# Patient Record
Sex: MALE | Race: WHITE
[De-identification: names, ages, dates, MRNs, and addresses within clinical notes are randomized per-mention and may not be internally consistent; named-entity substitution may affect disease eponyms.]

---

## 2018-09-24 ENCOUNTER — HOSPITAL ENCOUNTER (INPATIENT)
Dept: HOSPITAL 56 - MW.SDS | Age: 68
LOS: 1 days | Discharge: HOME | DRG: 561 | End: 2018-09-25
Attending: ORTHOPAEDIC SURGERY | Admitting: ORTHOPAEDIC SURGERY
Payer: MEDICARE

## 2018-09-24 DIAGNOSIS — Z79.82: ICD-10-CM

## 2018-09-24 DIAGNOSIS — K21.9: ICD-10-CM

## 2018-09-24 DIAGNOSIS — N18.9: ICD-10-CM

## 2018-09-24 DIAGNOSIS — Z79.4: ICD-10-CM

## 2018-09-24 DIAGNOSIS — K22.70: ICD-10-CM

## 2018-09-24 DIAGNOSIS — I10: ICD-10-CM

## 2018-09-24 DIAGNOSIS — I25.10: ICD-10-CM

## 2018-09-24 DIAGNOSIS — E78.00: ICD-10-CM

## 2018-09-24 DIAGNOSIS — M17.12: Primary | ICD-10-CM

## 2018-09-24 DIAGNOSIS — Z87.891: ICD-10-CM

## 2018-09-24 DIAGNOSIS — Z47.1: ICD-10-CM

## 2018-09-24 DIAGNOSIS — E11.22: ICD-10-CM

## 2018-09-24 DIAGNOSIS — Z96.652: ICD-10-CM

## 2018-09-24 DIAGNOSIS — E11.9: ICD-10-CM

## 2018-09-24 DIAGNOSIS — Z79.899: ICD-10-CM

## 2018-09-24 DIAGNOSIS — G47.30: ICD-10-CM

## 2018-09-24 LAB
CHLORIDE SERPL-SCNC: 105 MMOL/L (ref 98–107)
SODIUM SERPL-SCNC: 140 MMOL/L (ref 136–148)

## 2018-09-24 PROCEDURE — 86901 BLOOD TYPING SEROLOGIC RH(D): CPT

## 2018-09-24 PROCEDURE — 86900 BLOOD TYPING SEROLOGIC ABO: CPT

## 2018-09-24 PROCEDURE — 73560 X-RAY EXAM OF KNEE 1 OR 2: CPT

## 2018-09-24 PROCEDURE — C1713 ANCHOR/SCREW BN/BN,TIS/BN: HCPCS

## 2018-09-24 PROCEDURE — 82962 GLUCOSE BLOOD TEST: CPT

## 2018-09-24 PROCEDURE — 86850 RBC ANTIBODY SCREEN: CPT

## 2018-09-24 PROCEDURE — 0SRD069 REPLACEMENT OF LEFT KNEE JOINT WITH OXIDIZED ZIRCONIUM ON POLYETHYLENE SYNTHETIC SUBSTITUTE, CEMENTED, OPEN APPROACH: ICD-10-PCS | Performed by: ORTHOPAEDIC SURGERY

## 2018-09-24 PROCEDURE — 36415 COLL VENOUS BLD VENIPUNCTURE: CPT

## 2018-09-24 PROCEDURE — 27447 TOTAL KNEE ARTHROPLASTY: CPT

## 2018-09-24 PROCEDURE — 85025 COMPLETE CBC W/AUTO DIFF WBC: CPT

## 2018-09-24 PROCEDURE — 80053 COMPREHEN METABOLIC PANEL: CPT

## 2018-09-24 PROCEDURE — C1776 JOINT DEVICE (IMPLANTABLE): HCPCS

## 2018-09-24 RX ADMIN — ACETAMINOPHEN SCH MLS/HR: 10 INJECTION, SOLUTION INTRAVENOUS at 18:18

## 2018-09-24 RX ADMIN — ACETAMINOPHEN SCH MLS/HR: 10 INJECTION, SOLUTION INTRAVENOUS at 13:40

## 2018-09-24 RX ADMIN — ACETAMINOPHEN SCH MLS/HR: 10 INJECTION, SOLUTION INTRAVENOUS at 07:13

## 2018-09-24 RX ADMIN — KETOROLAC TROMETHAMINE SCH MG: 15 INJECTION, SOLUTION INTRAMUSCULAR; INTRAVENOUS at 14:59

## 2018-09-24 RX ADMIN — KETOROLAC TROMETHAMINE SCH MG: 15 INJECTION, SOLUTION INTRAMUSCULAR; INTRAVENOUS at 20:17

## 2018-09-24 RX ADMIN — ACETAMINOPHEN SCH MLS/HR: 10 INJECTION, SOLUTION INTRAVENOUS at 13:48

## 2018-09-24 NOTE — PCM.CONS
<Zena VincentandaSincere - Last Filed: 09/24/18 13:44>





H&P History of Present Illness





- General


Date of Service: 09/24/18


Admit Problem/Dx: 


 Admission Diagnosis/Problem





Admission Diagnosis/Problem      Replacement of total knee joint











- History of Present Illness


Initial Comments - Free Text/Narative: 





Daniel Carter is a 69 y/o male with history of Diabetes Mellitus type 2 on 

insulin and hypertension who is s/p left total knee arthroplasty by Dr. Bravo 

on 9/24/18.  Patient denies any pain in his lower extremities. States that his 

pain is well controlled with current medications. Denies any chest pain, dyspnea

, abdominal pain. No dysuria or change in bowel movements. 





- Related Data


Allergies/Adverse Reactions: 


 Allergies











Allergy/AdvReac Type Severity Reaction Status Date / Time


 


apple Allergy  Hives Verified 09/24/18 13:15











Home Medications: 


 Home Meds





Allopurinol [Zyloprim] 300 mg PO DAILY 12/28/17 [History]


Aspirin [Adult Low Dose Aspirin EC] 81 mg PO DAILY 12/28/17 [History]


Bisoprolol [Zebeta] 5 mg PO DAILY 12/28/17 [History]


Hydrochlorothiazide 25 mg PO DAILY 12/28/17 [History]


Insulin Aspart [NovoLOG] 8 unit SQ TIDMEALS 12/28/17 [History]


Insulin Glarg,Human.Rec.Analog [LantUS Solostar] 20 unit SQ QPM 12/28/17 [

History]


Omeprazole 20 mg PO DAILY 12/28/17 [History]


Pioglitazone HCl [Actos] 30 mg PO DAILY 12/28/17 [History]


Rosuvastatin [Crestor] 10 mg PO DAILY 12/28/17 [History]


amLODIPine [Norvasc] 10 mg PO DAILY 12/28/17 [History]











Past Medical History


HEENT History: Reports: Other (See Below)


Other HEENT History: wears glasses, has 2 upper dental implants


Cardiovascular History: Reports: CAD, High Cholesterol, Hypertension


Other Cardiovascular History: Stress test and Echo completed in June 2018


Respiratory History: Reports: Sleep Apnea


Other Respiratory History: uses CPAP


Gastrointestinal History: Reports: GERD, Hiatal Hernia, Other (See Below)


Other Gastrointestinal History: has Barretts Esophagus


Genitourinary History: Reports: None


Musculoskeletal History: Reports: Gout, Osteoarthritis


Neurological History: Reports: None


Endocrine/Metabolic History: Reports: Diabetes, Type II, Obesity/BMI 30+





- Infectious Disease History


Infectious Disease History: Reports: Chicken Pox





- Past Surgical History


Head Surgeries/Procedures: Reports: None


HEENT Surgical History: Reports: Adenoidectomy, Oral Surgery, Tonsillectomy


Other HEENT Surgeries/Procedures: 2 dental implants


Cardiovascular Surgical History: Reports: Vascular Surgery


Other Cardiovascular Surgeries/Procedures: right femoral artery stent placement


GI Surgical History: Reports: Colonoscopy, EGD, Nissen Fundoplication


Neurological Surgical History: Reports: Lumbar Spine


Other Neurological Surgeries/Procedures: hx back surgery, denies hardware


Musculoskeletal Surgical History: Reports: Arthroscopic Knee, Shoulder Surgery


Other Musculoskeletal Surgeries/Procedures:: left RTCR, denies hardware


Dermatological Surgical History: Reports: None





Social & Family History





- Family History


HEENT: Reports: None


Cardiac: Reports: CAD, Hypertension


Respiratory: Reports: Asthma, Sleep Apnea


GI: Reports: GERD


: Reports: None


OBGYN: Reports: None


Musculoskeletal: Reports: Arthritis, Gout, Neck Pain, Chronic, Osteoarthritis


Neurological: Reports: None


Psychiatric: Reports: None


Endocrine/Metabolic: Reports: Diabetes, type II


Hematologic: Reports: None


Immunologic: Reports: None


Dermatologic: Reports: None


Oncologic: Reports: None





- Tobacco Use


Smoking Status *Q: Former Smoker


Years of Tobacco use: 23


Packs/Tins Daily: 2


Used Tobacco, but Quit: Yes


Month/Year Tobacco Last Used: 29 years


Second Hand Smoke Exposure: No





- Caffeine Use


Caffeine Use: Reports: Coffee, Tea





- Recreational Drug Use


Recreational Drug Use: No


Drug Use in Last 12 Months: No





H&P Review of Systems





- Review of Systems:


Review Of Systems: ROS reveals no pertinent complaints other than HPI.





Exam





- Exam


Exam: See Below





- Vital Signs


Vital Signs: 


 Last Vital Signs











Temp  36.8 C   09/24/18 06:50


 


Pulse  71   09/24/18 10:52


 


Resp  12   09/24/18 10:52


 


BP  118/46 L  09/24/18 10:52


 


Pulse Ox  94 L  09/24/18 10:52











Weight: 113.398 kg





- Exam


General: Alert, Oriented, 4


HEENT: Conjunctiva Clear, Mucosa Moist & Pink, Posterior Pharynx Clear, Pupils 

Equal, Pupils Reactive, PERRLA


Neck: Supple, Trachea Midline, 2


Lungs: Clear to Auscultation, Normal Respiratory Effort


Cardiovascular: Regular Rate, Regular Rhythm


GI/Abdominal Exam: Normal Bowel Sounds, Soft, Non-Tender, No Organomegaly, No 

Distention, No Abnormal Bruit, No Mass, Pelvis Stable


 (Male) Exam: Deferred


Rectal (Males) Exam: Deferred


Back Exam: Normal Inspection, Full Range of Motion, NT


Extremities: Other (Left lower extremity- Pedal pulses intact. Patient able to 

wiggle his toes and elevate his leg. )


Skin: Warm, Dry, Intact


Neurological: Cranial Nerves Intact


Neuro Extensive - Mental Status: Alert, Oriented x3, Normal Mood/Affect, Normal 

Cognition


Psychiatric: Alert, Normal Affect, Normal Mood





- Patient Data


Lab Results Last 24 hrs: 


 Laboratory Results - last 24 hr











  09/24/18 09/24/18 09/24/18 Range/Units





  07:10 11:39 12:20 


 


WBC     (4.0-11.0)  K/uL


 


RBC     (4.50-5.90)  M/uL


 


Hgb     (13.0-17.0)  g/dL


 


Hct     (38.0-50.0)  %


 


MCV     (80.0-98.0)  fL


 


MCH     (27.0-32.0)  pg


 


MCHC     (31.0-37.0)  g/dL


 


RDW Std Deviation     (28.0-62.0)  fl


 


RDW Coeff of Guillermina     (11.0-15.0)  %


 


Plt Count     (150-400)  K/uL


 


MPV     (7.40-12.00)  fL


 


Neut % (Auto)     (48.0-80.0)  %


 


Lymph % (Auto)     (16.0-40.0)  %


 


Mono % (Auto)     (0.0-15.0)  %


 


Eos % (Auto)     (0.0-7.0)  %


 


Baso % (Auto)     (0.0-1.5)  %


 


Neut # (Auto)     (1.4-5.7)  K/uL


 


Lymph # (Auto)     (0.6-2.4)  K/uL


 


Mono # (Auto)     (0.0-0.8)  K/uL


 


Eos # (Auto)     (0.0-0.7)  K/uL


 


Baso # (Auto)     (0.0-0.1)  K/uL


 


Nucleated RBC %     /100WBC


 


Nucleated RBCs #     K/uL


 


Sodium    140  (136-148)  mmol/L


 


Potassium    4.5  (3.5-5.1)  mmol/L


 


Chloride    105  ()  mmol/L


 


Carbon Dioxide    26.9  (21.0-32.0)  mmol/L


 


BUN    31 H  (7.0-18.0)  mg/dL


 


Creatinine    1.9 H  (0.8-1.3)  mg/dL


 


Est Cr Clr Drug Dosing    39.71  mL/min


 


Estimated GFR (MDRD)    35.2  ml/min


 


Glucose    151 H  ()  mg/dL


 


POC Glucose   141 H   ()  mg/dL


 


Calcium    8.6  (8.5-10.1)  mg/dL


 


Total Bilirubin    0.2  (0.2-1.0)  mg/dL


 


AST    15  (15-37)  IU/L


 


ALT    21  (14-63)  IU/L


 


Alkaline Phosphatase    103  ()  U/L


 


Total Protein    5.9 L  (6.4-8.2)  g/dL


 


Albumin    2.8 L  (3.4-5.0)  g/dL


 


Globulin    3.1  (2.0-3.5)  g/dL


 


Albumin/Globulin Ratio    0.9 L  (1.3-2.8)  


 


Blood Type  O POSITIVE    


 


Antibody Screen  NEGATIVE    














  09/24/18 Range/Units





  12:20 


 


WBC  7.13  (4.0-11.0)  K/uL


 


RBC  4.62  (4.50-5.90)  M/uL


 


Hgb  14.3  (13.0-17.0)  g/dL


 


Hct  41.9  (38.0-50.0)  %


 


MCV  90.7  (80.0-98.0)  fL


 


MCH  31.0  (27.0-32.0)  pg


 


MCHC  34.1  (31.0-37.0)  g/dL


 


RDW Std Deviation  47.2  (28.0-62.0)  fl


 


RDW Coeff of Guillermina  14  (11.0-15.0)  %


 


Plt Count  168  (150-400)  K/uL


 


MPV  9.70  (7.40-12.00)  fL


 


Neut % (Auto)  77.5  (48.0-80.0)  %


 


Lymph % (Auto)  15.1 L  (16.0-40.0)  %


 


Mono % (Auto)  6.5  (0.0-15.0)  %


 


Eos % (Auto)  0.6  (0.0-7.0)  %


 


Baso % (Auto)  0.3  (0.0-1.5)  %


 


Neut # (Auto)  5.5  (1.4-5.7)  K/uL


 


Lymph # (Auto)  1.1  (0.6-2.4)  K/uL


 


Mono # (Auto)  0.5  (0.0-0.8)  K/uL


 


Eos # (Auto)  0.0  (0.0-0.7)  K/uL


 


Baso # (Auto)  0.0  (0.0-0.1)  K/uL


 


Nucleated RBC %  0.0  /100WBC


 


Nucleated RBCs #  0  K/uL


 


Sodium   (136-148)  mmol/L


 


Potassium   (3.5-5.1)  mmol/L


 


Chloride   ()  mmol/L


 


Carbon Dioxide   (21.0-32.0)  mmol/L


 


BUN   (7.0-18.0)  mg/dL


 


Creatinine   (0.8-1.3)  mg/dL


 


Est Cr Clr Drug Dosing   mL/min


 


Estimated GFR (MDRD)   ml/min


 


Glucose   ()  mg/dL


 


POC Glucose   ()  mg/dL


 


Calcium   (8.5-10.1)  mg/dL


 


Total Bilirubin   (0.2-1.0)  mg/dL


 


AST   (15-37)  IU/L


 


ALT   (14-63)  IU/L


 


Alkaline Phosphatase   ()  U/L


 


Total Protein   (6.4-8.2)  g/dL


 


Albumin   (3.4-5.0)  g/dL


 


Globulin   (2.0-3.5)  g/dL


 


Albumin/Globulin Ratio   (1.3-2.8)  


 


Blood Type   


 


Antibody Screen   











Result Diagrams: 


 09/24/18 12:20





 09/24/18 12:20





Consult PN Assessment/Plan


Procedures: 


Procedures





CARDIOVASCULAR STRESS TEST (04/25/18)


DRAIN/INJ JOINT/BURSA W/O US (02/13/18)


HT MUSCLE IMAGE SPECT MULT (04/25/18)


KNEE ARTHROSCOPY/SURGERY (01/03/18)


METABOLIC PANEL TOTAL CA (05/25/18)


MRI JNT OF LWR EXTRE W/O DYE (08/13/18)


OFFICE/OUTPATIENT VISIT EST (02/13/18)


ROUTINE VENIPUNCTURE (05/25/18)


TTE W/DOPPLER COMPLETE (05/10/18)


X-RAY EXAM KNEE 4 OR MORE (12/12/17)


X-RAY EXAM OF SHOULDER (02/13/18)








Problem List Initiated/Reviewed/Updated: Yes


Plan: 





1. Osteoarthritis s/p left total knee arthroplasty on 9/24/18.  Will continue 

with current pain management per orthopedic team. PT/OT has been ordered by 

ortho team.


2. Chronic kidney disease. Likely secondary to diabetes mellitus type 2. Patient

's Creatinine is 1.9.  His previous Cr in May 2018 was 1.8.  This seems to be 

his baseline.  Will continue to monitor his I/O's.  If worsening Cr, will 

discontinue medications such as allopurinol and diuretics and start IV fluids.


3. Isulin dependent Diabetes mellitus. Will continue with patient's home 

regimen of Novolog 8 U TID and Lantus 20 U QHS.


4. Hypertension, controlled. Will continue with current blood pressure 

medications.


5. DVT prophylaxis. Per orthopedic team.





<Lb Hernandez - Last Filed: 09/24/18 16:01>





H&P History of Present Illness





- General


Admit Problem/Dx: 


 Admission Diagnosis/Problem





Admission Diagnosis/Problem      Replacement of total knee joint











Exam





- Vital Signs


Vital Signs: 


 Last Vital Signs











Temp  36.3 C   09/24/18 13:47


 


Pulse  65   09/24/18 13:47


 


Resp  16   09/24/18 13:47


 


BP  126/62   09/24/18 13:47


 


Pulse Ox  96   09/24/18 13:47














- Patient Data


Lab Results Last 24 hrs: 


 Laboratory Results - last 24 hr











  09/24/18 09/24/18 09/24/18 Range/Units





  07:10 11:39 12:20 


 


WBC     (4.0-11.0)  K/uL


 


RBC     (4.50-5.90)  M/uL


 


Hgb     (13.0-17.0)  g/dL


 


Hct     (38.0-50.0)  %


 


MCV     (80.0-98.0)  fL


 


MCH     (27.0-32.0)  pg


 


MCHC     (31.0-37.0)  g/dL


 


RDW Std Deviation     (28.0-62.0)  fl


 


RDW Coeff of Guillermina     (11.0-15.0)  %


 


Plt Count     (150-400)  K/uL


 


MPV     (7.40-12.00)  fL


 


Neut % (Auto)     (48.0-80.0)  %


 


Lymph % (Auto)     (16.0-40.0)  %


 


Mono % (Auto)     (0.0-15.0)  %


 


Eos % (Auto)     (0.0-7.0)  %


 


Baso % (Auto)     (0.0-1.5)  %


 


Neut # (Auto)     (1.4-5.7)  K/uL


 


Lymph # (Auto)     (0.6-2.4)  K/uL


 


Mono # (Auto)     (0.0-0.8)  K/uL


 


Eos # (Auto)     (0.0-0.7)  K/uL


 


Baso # (Auto)     (0.0-0.1)  K/uL


 


Nucleated RBC %     /100WBC


 


Nucleated RBCs #     K/uL


 


Sodium    140  (136-148)  mmol/L


 


Potassium    4.5  (3.5-5.1)  mmol/L


 


Chloride    105  ()  mmol/L


 


Carbon Dioxide    26.9  (21.0-32.0)  mmol/L


 


BUN    31 H  (7.0-18.0)  mg/dL


 


Creatinine    1.9 H  (0.8-1.3)  mg/dL


 


Est Cr Clr Drug Dosing    39.71  mL/min


 


Estimated GFR (MDRD)    35.2  ml/min


 


Glucose    151 H  ()  mg/dL


 


POC Glucose   141 H   ()  mg/dL


 


Calcium    8.6  (8.5-10.1)  mg/dL


 


Total Bilirubin    0.2  (0.2-1.0)  mg/dL


 


AST    15  (15-37)  IU/L


 


ALT    21  (14-63)  IU/L


 


Alkaline Phosphatase    103  ()  U/L


 


Total Protein    5.9 L  (6.4-8.2)  g/dL


 


Albumin    2.8 L  (3.4-5.0)  g/dL


 


Globulin    3.1  (2.0-3.5)  g/dL


 


Albumin/Globulin Ratio    0.9 L  (1.3-2.8)  


 


Blood Type  O POSITIVE    


 


Antibody Screen  NEGATIVE    














  09/24/18 Range/Units





  12:20 


 


WBC  7.13  (4.0-11.0)  K/uL


 


RBC  4.62  (4.50-5.90)  M/uL


 


Hgb  14.3  (13.0-17.0)  g/dL


 


Hct  41.9  (38.0-50.0)  %


 


MCV  90.7  (80.0-98.0)  fL


 


MCH  31.0  (27.0-32.0)  pg


 


MCHC  34.1  (31.0-37.0)  g/dL


 


RDW Std Deviation  47.2  (28.0-62.0)  fl


 


RDW Coeff of Guillermina  14  (11.0-15.0)  %


 


Plt Count  168  (150-400)  K/uL


 


MPV  9.70  (7.40-12.00)  fL


 


Neut % (Auto)  77.5  (48.0-80.0)  %


 


Lymph % (Auto)  15.1 L  (16.0-40.0)  %


 


Mono % (Auto)  6.5  (0.0-15.0)  %


 


Eos % (Auto)  0.6  (0.0-7.0)  %


 


Baso % (Auto)  0.3  (0.0-1.5)  %


 


Neut # (Auto)  5.5  (1.4-5.7)  K/uL


 


Lymph # (Auto)  1.1  (0.6-2.4)  K/uL


 


Mono # (Auto)  0.5  (0.0-0.8)  K/uL


 


Eos # (Auto)  0.0  (0.0-0.7)  K/uL


 


Baso # (Auto)  0.0  (0.0-0.1)  K/uL


 


Nucleated RBC %  0.0  /100WBC


 


Nucleated RBCs #  0  K/uL


 


Sodium   (136-148)  mmol/L


 


Potassium   (3.5-5.1)  mmol/L


 


Chloride   ()  mmol/L


 


Carbon Dioxide   (21.0-32.0)  mmol/L


 


BUN   (7.0-18.0)  mg/dL


 


Creatinine   (0.8-1.3)  mg/dL


 


Est Cr Clr Drug Dosing   mL/min


 


Estimated GFR (MDRD)   ml/min


 


Glucose   ()  mg/dL


 


POC Glucose   ()  mg/dL


 


Calcium   (8.5-10.1)  mg/dL


 


Total Bilirubin   (0.2-1.0)  mg/dL


 


AST   (15-37)  IU/L


 


ALT   (14-63)  IU/L


 


Alkaline Phosphatase   ()  U/L


 


Total Protein   (6.4-8.2)  g/dL


 


Albumin   (3.4-5.0)  g/dL


 


Globulin   (2.0-3.5)  g/dL


 


Albumin/Globulin Ratio   (1.3-2.8)  


 


Blood Type   


 


Antibody Screen   











Result Diagrams: 


 09/24/18 12:20





 09/24/18 12:20





Consult PN Assessment/Plan


Procedures: 


Procedures





CARDIOVASCULAR STRESS TEST (04/25/18)


DRAIN/INJ JOINT/BURSA W/O US (02/13/18)


HT MUSCLE IMAGE SPECT MULT (04/25/18)


KNEE ARTHROSCOPY/SURGERY (01/03/18)


METABOLIC PANEL TOTAL CA (05/25/18)


MRI JNT OF LWR EXTRE W/O DYE (08/13/18)


OFFICE/OUTPATIENT VISIT EST (02/13/18)


ROUTINE VENIPUNCTURE (05/25/18)


TTE W/DOPPLER COMPLETE (05/10/18)


X-RAY EXAM KNEE 4 OR MORE (12/12/17)


X-RAY EXAM OF SHOULDER (02/13/18)





Internal medicine service has been requested to provide consultation on patient 

by orthopedic surgeon. I have examined the patient independently of medical 

resident and I agree with the resident's findings and recommendations. We'll 

continue to follow closely this patient. I'd like to thank Dr. Bravo for 

participation in care of her patient.


Requesting Provider: Shelly


Date Consult Requested: 09/24/18


Patient History Reviewed: Yes


Admission H&P Reviewed: Yes


Notified Requestor: Yes

## 2018-09-24 NOTE — PCM.PREANE
Preanesthetic Assessment





- Procedure


Proposed Procedure: 





Left TKR





- Anesthesia/Transfusion/Family Hx


Anesthesia History: Prior Anesthesia Without Reaction


Family History of Anesthesia Reaction: No


Transfusion History: No Prior Transfusion(s)


Intubation History: Unknown


Additional History: 





sleep apnea hx; former smoker;insulin dependent diabetic (FBS at 0600 - 120) - 

took 1/2 Lantus last PM








- Review of Systems


General: Other (bilateral leg pain)


Pulmonary: No Symptoms, Other (EVA)


Cardiovascular: Other (HTN-treated; PVD)


Gastrointestinal: Other (Barretts esophagus)


Neurological: Gait Disturbance (due to knee pains)


Other: Reports: Diabetes (Type II - insulin dependent), Depression





- Physical Assessment


NPO Status Date: 09/23/18


NPO Status Time: 22:00


Height: 6 ft


Weight: 250 lb


ASA Class: 3


Mental Status: Alert & Oriented x3


Airway Class: Mallampati = 2


Dentition: Reports: Normal Dentition, Implants


Thyro-Mental Finger Breadths: 3


Mouth Opening Finger Breadths: 3


ROM/Head Extension: Full


Lungs: Clear to Auscultation, Normal Respiratory Effort


Cardiovascular: Regular Rate, Regular Rhythm, No Murmurs





- Allergies


Allergies/Adverse Reactions: 


 Allergies











Allergy/AdvReac Type Severity Reaction Status Date / Time


 


No Known Allergies Allergy   Verified 09/19/18 09:54














- Blood


Blood Available: No


Product(s) Available: None





- Anesthesia Plan


Pre-Op Medication Ordered: Other (meds for post op per surgeon)





- Acknowledgements


Anesthesia Type Planned: Spinal (sedation only unless airway problem)


Pt an Appropriate Candidate for the Planned Anesthesia: Yes


Alternatives and Risks of Anesthesia Discussed w Pt/Guardian: Yes


Pt/Guardian Understands and Agrees with Anesthesia Plan: Yes





PreAnesthesia Questionnaire


HEENT History: Reports: Other (See Below)


Other HEENT History: wears glasses, has 2 upper dental implants


Cardiovascular History: Reports: CAD, High Cholesterol, Hypertension


Respiratory History: Reports: Sleep Apnea


Other Respiratory History: uses CPAP


Gastrointestinal History: Reports: GERD, Hiatal Hernia, Other (See Below)


Other Gastrointestinal History: has Barretts Esophagus


Genitourinary History: Reports: None


Musculoskeletal History: Reports: Gout, Osteoarthritis


Endocrine/Metabolic History: Reports: Diabetes, Type II, Obesity/BMI 30+





- Past Surgical History


Head Surgeries/Procedures: Reports: None


HEENT Surgical History: Reports: Oral Surgery, Tonsillectomy


Other HEENT Surgeries/Procedures: 2 dental implants


Cardiovascular Surgical History: Reports: Vascular Surgery


Other Cardiovascular Surgeries/Procedures: right femoral artery stent placement


GI Surgical History: Reports: Colonoscopy, EGD, Nissen Fundoplication


Neurological Surgical History: Reports: Lumbar Spine


Other Neurological Surgeries/Procedures: hx back surgery, denies hardware


Musculoskeletal Surgical History: Reports: Arthroscopic Knee, Shoulder Surgery


Other Musculoskeletal Surgeries/Procedures:: left RTCR, denies hardware





- SUBSTANCE USE


Smoking Status *Q: Former Smoker


Recreational Drug Use History: No





- HOME MEDS


Home Medications: 


 Home Meds





Allopurinol [Zyloprim] 300 mg PO DAILY 12/28/17 [History]


Aspirin [Adult Low Dose Aspirin EC] 81 mg PO DAILY 12/28/17 [History]


Bisoprolol [Zebeta] 5 mg PO DAILY 12/28/17 [History]


Hydrochlorothiazide 25 mg PO DAILY 12/28/17 [History]


Insulin Aspart [NovoLOG] 8 unit SQ TIDMEALS 12/28/17 [History]


Insulin Glarg,Human.Rec.Analog [LantUS Solostar] 20 unit SQ QPM 12/28/17 [

History]


Omeprazole 20 mg PO DAILY 12/28/17 [History]


Pioglitazone HCl [Actos] 30 mg PO DAILY 12/28/17 [History]


Rosuvastatin [Crestor] 10 mg PO DAILY 12/28/17 [History]


amLODIPine [Norvasc] 10 mg PO DAILY 12/28/17 [History]











- CURRENT (IN HOUSE) MEDS


Current Meds: 





 Current Medications





Bupivacaine Liposome (Exparel)  20 ml INFILT ONETIME [facility]


Famotidine (Pepcid)  40 mg IVPUSH ONARRIVE [facility]


   Last Admin: 09/24/18 07:14 Dose:  40 mg


Acetaminophen 1,000 mg/ Premix  100 mls @ 400 mls/hr IV ONARRIVE [facility]


   Last Admin: 09/24/18 07:13 Dose:  400 mls/hr


Cefazolin Sodium/Dextrose 2 gm (/ Premix)  50 mls @ 100 mls/hr IV ONCALL [facility]


Ropivacaine 49.25 ml/Ketorolac Tromethamine 30 mg/Epinephrine HCl 0.5 mg/

Clonidine HCl 80 mcg/ Sodium Chloride  70 mls @ 50 mls/sec INJECT ASDIRECTED [facility]


Lactated Ringer's (Ringers, Lactated)  1,000 mls @ 100 mls/hr IV ASDIRECTED [facility]


Tranexamic Acid 4,000 mg/ (Sodium Chloride)  140 mls @ 600 mls/hr IV ASDIRECTED 

[facility]


Ketorolac Tromethamine (Toradol)  15 mg IVPUSH ONARRIVE [facility]


   Last Admin: 09/24/18 07:15 Dose:  15 mg


Scopolamine (Transderm-Scop)  1.5 mg TRDERM ONARRIVE [facility]


   Last Admin: 09/24/18 07:14 Dose:  1.5 mg

## 2018-09-24 NOTE — OR
SURGEON:

Shefali Bravo MD

 

DATE OF PROCEDURE:  09/24/2018

 

PREOPERATIVE DIAGNOSIS:

Degenerative joint disease, left knee, tricompartmental.

 

POSTOPERATIVE DIAGNOSIS:

Degenerative joint disease, left knee, tricompartmental.

 

PROCEDURE:

Left total knee arthroplasty using patient specific instrumentation.

 

ASSISTANT:

Ivory White PA-C

 

ANESTHESIA:

Spinal with sedation.

 

ESTIMATED BLOOD LOSS:

50 mL.

 

TOURNIQUET TIME:

48 minutes.

 

COMPLICATIONS:

None.

 

DVT PROPHYLAXIS:

PAS boot and NIDHI hose to the nonoperative leg.

 

IMPLANTS USED:

[employer] Persona femoral component size 9 standard (LPS), tibial component size G,

10 mm all-polyethylene articular surface, and 38 mm all-polyethylene patella.

 

INTRAOPERATIVE FINDINGS:

Showed tricompartmental degenerative changes with grade 4 chondromalacia in all

compartments.  No significant synovitis was noted.  Small osteophytes were also

found.

 

BRIEF HISTORY:

Daniel is a 68-year-old male who has had complaint of progressive left knee

pain.  X-rays did confirm tricompartmental degenerative changes.  He failed to

respond to conservative treatment.  Due to his lack of response to conservative

treatment, I did recommend surgical intervention.  The risks and goals of

procedure were discussed with the patient and were documented preoperatively.

He agreed to proceed.

 

DESCRIPTION OF PROCEDURE:

The patient was properly identified and brought to the operating room.  The

patient was then transferred from the operating room cart and placed on the

operating table in a supine position.  Anesthesia was administered by the

anesthesia staff.  After adequate anesthesia was obtained, a well-padded

tourniquet was applied to the surgical lower extremity.  Lisa catheter was

placed. The lower extremity was then prepped in standard fashion using

ChloraPrep solution.  It was then sterilely draped.  A time-out was performed to

ensure correct site and procedure.  Preoperative antibiotics were given along

with one gram of tranexamic acid IV.  The surgical site had been marked

preoperatively.  An Esmarch was used to exsanguinate the left lower extremity

and the tourniquet was inflated.

 

An incision was made over the anterior aspect of the knee.  The subcutaneous

tissues were dissected down to the level of the fascia.  A medial parapatellar

approach to the knee was made.  A portion of the infrapatellar fat pad was then

excised.  The distal femur was then exposed.  The femoral patient-specific

cutting guide was then placed.  Pins were also placed.  The distal femoral

cutting block was placed and the distal femoral cut was made.  Instrumentation

was then removed.  Both Whitesides' line and the epicondylar axis were then

marked with electrocautery.  The 4-in-1 cutting block was placed.  This was

placed in a slightly externally rotated position, which corresponded well with

the previously drawn lines.  The cutting guide was then pinned into position.

An Edmundo wing guide was used to check the depth of resection of our anterior

condylar cut and it was felt that no notching would occur.  The anterior

condylar cut was then made followed by the posterior condylar cut.  Both the

posterior chamfer and anterior chamfer cuts were then made.  The cutting block

was then removed along with the excess bony remnants.

 

We then turned our attention to the tibia.  The anterior cruciate ligament and

posterior cruciate ligament were released and a posterior cruciate ligament

retractor was placed to allow the tibia to be pulled anteriorly.  The tibial

patient-specific guide was then placed on the proximal tibia.  This fit

anatomically.  The pins were then placed.  The proximal tibia cutting guide was

then placed and screwed into position.  The proximal tibial resection was then

made with care being taken to protect the patellar tendon.  The bony resection

was then removed.  The remainder of the medial and lateral meniscus were then

excised.  Care was taken to protect the popliteus tendon.  The tibia was then

sized to the appropriate size.

 

The distal femur was then elevated. The posterior capsule was stripped off the

distal femur both medially and laterally.  The posterior capsule along with the

medial and lateral gutters were then injected with a standard mixture consisting

of clonidine, epinephrine, Toradol, and Ropivacaine, unless any allergies were

found preoperatively.  Exaprel 10 mg was also administered in a similar manner. 
The femoral component was then placed onto the distal

femur in a slightly lateral position.  This fit the femur well.  A box cut was

then made without difficulty.  This was then removed.  The tibial trial along

with the polyethylene liner was then placed.  The knee came easily into full

extension and was stable to varus and valgus stressing both in full extension

and flexion.  Any additional releases were performed at this time.

 

We then returned our attention to the patella.  The patella was everted and

towel clamps were used to hold the patella in position.  It was resected to a 15

millimeter thickness.  It was then sized to the appropriate size.  It was

prepared in the usual fashion after placing the predetermined size clamps.  This

was placed in a slightly superior and medial position.  The clamp was then

removed.  The patellar trial button was placed.  The knee was taken through a

range of motion using the no-touch technique.  The patella tracked centrally. A

drop lesia was then placed to check alignment.

 

All instruments were then removed from the knee.  The tibial sizer was then

placed on the tibia.  The tibia was prepared in the usual fashion  using the

reamer and broach.  This was then removed.  All bony surfaces were copiously

irrigated with Pulsavac solution.  They were then suctioned dry.

 

Cement was prepared on the back table in the usual manner.  Once it was

prepared, the bone ends were again suctioned dry.  The tibia was cemented into

place first.  This was malleted into position.  Excess cement was then cleared.

The femur was then placed in a similar manner.  We placed the polyethylene trial

into place and the knee was brought into full extension.  An axial load was

placed while keeping the knee in full extension.  The patella button was also

cemented into position and the clamp was used to hold this in place as the

cement was allowed to cure. The wound was again copiously irrigated with saline

solution using a Pulsavac . Following this 1 g of tranexamic acid was

applied to the wound topically.

 

After we had adequate curing of the cement, the knee was again taken through a

range of motion.  The size of the polyethylene was then determined.  The

polyethylene trial was then removed.  The tibial tray was suctioned to make sure

there was no remaining soft tissue or cement.  Excess cement was cleared from

around the edges of the prosthesis as well.

 

The tourniquet was then deflated.  We were able to observe for any excess

bleeding and none was noted.  Electrocautery was used to maintain hemostasis.

An additional gram of tranexamic acid was given IV.

 

The retractors were again placed and the predetermined polyethylene was then

placed.  This was locked into position without difficulty.  The knee was again

taken through a range of motion with no change from the prior exam.Exaprel 10mg 
was placed along the incision.

 

The fascial layer was closed with Number One Vicryl.  The subcutaneous tissues

were closed with 2-0 Vicryl.  The skin was closed with staples.  Xeroform gauze

was placed over the wound and a bulky dressing was applied.

 

The patient was then awakened from anesthesia and transferred back to the

operating room cart.  They were brought to the recovery room in stable

condition.  All needle and sponge counts were correct.

 

 

VLADIMIR / ALYSSA

DD:  09/24/2018 10:01:16

DT:  09/24/2018 14:40:31

Job #:  [id number]

MTDD

## 2018-09-24 NOTE — PCM.POSTAN
POST ANESTHESIA ASSESSMENT





- MENTAL STATUS


Mental Status: Alert, Oriented





- RESPIRATORY


Respiratory Status: Respiratory Rate WNL, Airway Patent, O2 Saturation Stable





- CARDIOVASCULAR


CV Status: Pulse Rate WNL, Blood Pressure Stable





- GASTROINTESTINAL


GI Status: No Symptoms





- PAIN


Pain Score: 0 (able to move legs so residual spinal still effective)





- POST OP HYDRATION


Hydration Status: Adequate & Stable





- OBSERVATIONS


Free Text/Narrative:: 





To Phase II - med/surg

## 2018-09-24 NOTE — CR
EXAMINATION: Left knee

 

HISTORY: Arthroplasty

 

COMPARISON: 12/12/2017

 

TECHNIQUE: 2 views

 

FINDINGS/IMPRESSION: Left total knee hardware is demonstrated in good position and alignment. Operati
ve soft tissue changes noted.

## 2018-09-25 LAB
CHLORIDE SERPL-SCNC: 101 MMOL/L (ref 98–107)
SODIUM SERPL-SCNC: 135 MMOL/L (ref 136–148)

## 2018-09-25 RX ADMIN — ACETAMINOPHEN SCH MLS/HR: 10 INJECTION, SOLUTION INTRAVENOUS at 00:21

## 2018-09-25 RX ADMIN — ACETAMINOPHEN SCH MLS/HR: 10 INJECTION, SOLUTION INTRAVENOUS at 00:15

## 2018-09-25 RX ADMIN — KETOROLAC TROMETHAMINE SCH MG: 15 INJECTION, SOLUTION INTRAMUSCULAR; INTRAVENOUS at 03:22

## 2018-09-25 NOTE — PCM48HPAN
Post Anesthesia Note





- EVALUATION WITHIN 48HRS OF ANESTHETIC


Vital Signs in Normal Range: Yes


Patient Participated in Evaluation: Yes


Respiratory Function Stable: Yes


Airway Patent: Yes


Cardiovascular Function Stable: Yes


Hydration Status Stable: Yes


Pain Control Satisfactory: Yes


Nausea and Vomiting Control Satisfactory: Yes


Mental Status Recovered: Yes


Resp Rate: 18


Blood Pressure: 141/65

## 2018-09-25 NOTE — PCM.SURGPN
<Ivory White R - Last Filed: 09/25/18 06:53>





- General Info


Date of Service: 09/25/18


Date of Surgery/Procedure: 09/24/18


POD#: 1


Functional Status: Reports: Pain Controlled, Tolerating Diet, Ambulating





- Review of Systems


General: Reports: No Symptoms.  Denies: Fever


Pulmonary: Reports: No Symptoms.  Denies: Shortness of Breath


Cardiovascular: Reports: No Symptoms.  Denies: Chest Pain, Palpitations


Gastrointestinal: Reports: Nausea


Musculoskeletal: Reports: Leg Pain


Systems Review Comment:: 


pt up to chair for breakfast


tolerated PO intake well


IV was dc'ed last night after 2 accidental removals by pt


pain control w/o IV medications


some nausea


no specific concerns








- Patient Data


Vitals - Most Recent: 


 Last Vital Signs











Temp  98.0 F   09/25/18 05:00


 


Pulse  72   09/25/18 05:00


 


Resp  19   09/25/18 05:00


 


BP  119/60   09/25/18 05:00


 


Pulse Ox  96   09/25/18 05:00











Weight - Most Recent: 113.398 kg


I&O - Last 24 Hours: 


 Intake & Output











 09/24/18 09/24/18 09/25/18





 14:59 22:59 06:59


 


Intake Total 1550 2330 900


 


Output Total  600 950


 


Balance 1550 1730 -50











Lab Results Last 24 Hrs: 


 Laboratory Results - last 24 hr











  09/24/18 09/24/18 09/24/18 Range/Units





  07:10 11:39 12:20 


 


WBC     (4.0-11.0)  K/uL


 


RBC     (4.50-5.90)  M/uL


 


Hgb     (13.0-17.0)  g/dL


 


Hct     (38.0-50.0)  %


 


MCV     (80.0-98.0)  fL


 


MCH     (27.0-32.0)  pg


 


MCHC     (31.0-37.0)  g/dL


 


RDW Std Deviation     (28.0-62.0)  fl


 


RDW Coeff of Guillermina     (11.0-15.0)  %


 


Plt Count     (150-400)  K/uL


 


MPV     (7.40-12.00)  fL


 


Neut % (Auto)     (48.0-80.0)  %


 


Lymph % (Auto)     (16.0-40.0)  %


 


Mono % (Auto)     (0.0-15.0)  %


 


Eos % (Auto)     (0.0-7.0)  %


 


Baso % (Auto)     (0.0-1.5)  %


 


Neut # (Auto)     (1.4-5.7)  K/uL


 


Lymph # (Auto)     (0.6-2.4)  K/uL


 


Mono # (Auto)     (0.0-0.8)  K/uL


 


Eos # (Auto)     (0.0-0.7)  K/uL


 


Baso # (Auto)     (0.0-0.1)  K/uL


 


Nucleated RBC %     /100WBC


 


Nucleated RBCs #     K/uL


 


Sodium    140  (136-148)  mmol/L


 


Potassium    4.5  (3.5-5.1)  mmol/L


 


Chloride    105  ()  mmol/L


 


Carbon Dioxide    26.9  (21.0-32.0)  mmol/L


 


BUN    31 H  (7.0-18.0)  mg/dL


 


Creatinine    1.9 H  (0.8-1.3)  mg/dL


 


Est Cr Clr Drug Dosing    39.71  mL/min


 


Estimated GFR (MDRD)    35.2  ml/min


 


Glucose    151 H  ()  mg/dL


 


POC Glucose   141 H   ()  mg/dL


 


Calcium    8.6  (8.5-10.1)  mg/dL


 


Total Bilirubin    0.2  (0.2-1.0)  mg/dL


 


AST    15  (15-37)  IU/L


 


ALT    21  (14-63)  IU/L


 


Alkaline Phosphatase    103  ()  U/L


 


Total Protein    5.9 L  (6.4-8.2)  g/dL


 


Albumin    2.8 L  (3.4-5.0)  g/dL


 


Globulin    3.1  (2.0-3.5)  g/dL


 


Albumin/Globulin Ratio    0.9 L  (1.3-2.8)  


 


Blood Type  O POSITIVE    


 


Antibody Screen  NEGATIVE    














  09/24/18 09/24/18 09/25/18 Range/Units





  12:20 16:42 05:20 


 


WBC  7.13    (4.0-11.0)  K/uL


 


RBC  4.62    (4.50-5.90)  M/uL


 


Hgb  14.3   14.5  (13.0-17.0)  g/dL


 


Hct  41.9   42.4  (38.0-50.0)  %


 


MCV  90.7    (80.0-98.0)  fL


 


MCH  31.0    (27.0-32.0)  pg


 


MCHC  34.1    (31.0-37.0)  g/dL


 


RDW Std Deviation  47.2    (28.0-62.0)  fl


 


RDW Coeff of Guillermina  14    (11.0-15.0)  %


 


Plt Count  168    (150-400)  K/uL


 


MPV  9.70    (7.40-12.00)  fL


 


Neut % (Auto)  77.5    (48.0-80.0)  %


 


Lymph % (Auto)  15.1 L    (16.0-40.0)  %


 


Mono % (Auto)  6.5    (0.0-15.0)  %


 


Eos % (Auto)  0.6    (0.0-7.0)  %


 


Baso % (Auto)  0.3    (0.0-1.5)  %


 


Neut # (Auto)  5.5    (1.4-5.7)  K/uL


 


Lymph # (Auto)  1.1    (0.6-2.4)  K/uL


 


Mono # (Auto)  0.5    (0.0-0.8)  K/uL


 


Eos # (Auto)  0.0    (0.0-0.7)  K/uL


 


Baso # (Auto)  0.0    (0.0-0.1)  K/uL


 


Nucleated RBC %  0.0    /100WBC


 


Nucleated RBCs #  0    K/uL


 


Sodium     (136-148)  mmol/L


 


Potassium     (3.5-5.1)  mmol/L


 


Chloride     ()  mmol/L


 


Carbon Dioxide     (21.0-32.0)  mmol/L


 


BUN     (7.0-18.0)  mg/dL


 


Creatinine     (0.8-1.3)  mg/dL


 


Est Cr Clr Drug Dosing     mL/min


 


Estimated GFR (MDRD)     ml/min


 


Glucose     ()  mg/dL


 


POC Glucose   113 H   ()  mg/dL


 


Calcium     (8.5-10.1)  mg/dL


 


Total Bilirubin     (0.2-1.0)  mg/dL


 


AST     (15-37)  IU/L


 


ALT     (14-63)  IU/L


 


Alkaline Phosphatase     ()  U/L


 


Total Protein     (6.4-8.2)  g/dL


 


Albumin     (3.4-5.0)  g/dL


 


Globulin     (2.0-3.5)  g/dL


 


Albumin/Globulin Ratio     (1.3-2.8)  


 


Blood Type     


 


Antibody Screen     














  09/25/18 Range/Units





  05:20 


 


WBC   (4.0-11.0)  K/uL


 


RBC   (4.50-5.90)  M/uL


 


Hgb   (13.0-17.0)  g/dL


 


Hct   (38.0-50.0)  %


 


MCV   (80.0-98.0)  fL


 


MCH   (27.0-32.0)  pg


 


MCHC   (31.0-37.0)  g/dL


 


RDW Std Deviation   (28.0-62.0)  fl


 


RDW Coeff of Guillermina   (11.0-15.0)  %


 


Plt Count   (150-400)  K/uL


 


MPV   (7.40-12.00)  fL


 


Neut % (Auto)   (48.0-80.0)  %


 


Lymph % (Auto)   (16.0-40.0)  %


 


Mono % (Auto)   (0.0-15.0)  %


 


Eos % (Auto)   (0.0-7.0)  %


 


Baso % (Auto)   (0.0-1.5)  %


 


Neut # (Auto)   (1.4-5.7)  K/uL


 


Lymph # (Auto)   (0.6-2.4)  K/uL


 


Mono # (Auto)   (0.0-0.8)  K/uL


 


Eos # (Auto)   (0.0-0.7)  K/uL


 


Baso # (Auto)   (0.0-0.1)  K/uL


 


Nucleated RBC %   /100WBC


 


Nucleated RBCs #   K/uL


 


Sodium  135 L  (136-148)  mmol/L


 


Potassium  4.5  (3.5-5.1)  mmol/L


 


Chloride  101  ()  mmol/L


 


Carbon Dioxide  28.4  (21.0-32.0)  mmol/L


 


BUN  28 H  (7.0-18.0)  mg/dL


 


Creatinine  1.8 H  (0.8-1.3)  mg/dL


 


Est Cr Clr Drug Dosing  41.91  mL/min


 


Estimated GFR (MDRD)  37.5  ml/min


 


Glucose  113 H  ()  mg/dL


 


POC Glucose   ()  mg/dL


 


Calcium  8.4 L  (8.5-10.1)  mg/dL


 


Total Bilirubin  0.5  (0.2-1.0)  mg/dL


 


AST  19  (15-37)  IU/L


 


ALT  21  (14-63)  IU/L


 


Alkaline Phosphatase  103  ()  U/L


 


Total Protein  6.1 L  (6.4-8.2)  g/dL


 


Albumin  2.8 L  (3.4-5.0)  g/dL


 


Globulin  3.3  (2.0-3.5)  g/dL


 


Albumin/Globulin Ratio  0.9 L  (1.3-2.8)  


 


Blood Type   


 


Antibody Screen   











Med Orders - Current: 


 Current Medications





Al Hydroxide/Mg Hydroxide (Mag-Al Plus)  30 ml PO Q4H PRN


   PRN Reason: indigestion


Allopurinol (Zyloprim)  300 mg PO DAILY [facility]


Amlodipine Besylate (Norvasc)  10 mg PO DAILY [facility]


Aspirin (Aspirin)  325 mg PO BID [facility]


Bisacodyl (Dulcolax)  10 mg RECTAL DAILY PRN


   PRN Reason: Constipation


Bupivacaine Liposome (Exparel)  20 ml INFILT ONETIME [facility]


Celecoxib (Celebrex)  200 mg PO DAILY [facility]


Diphenhydramine HCl (Benadryl)  25 - 50 mg PO Q6H PRN


   PRN Reason: Itching


Docusate Sodium (Colace)  100 mg PO BID [facility]


   Last Admin: 09/24/18 20:17 Dose:  100 mg


Famotidine (Pepcid)  40 mg IVPUSH ONARRIVE [facility]


   Last Admin: 09/24/18 07:14 Dose:  40 mg


Famotidine (Pepcid)  40 mg PO DAILY [facility]


Hydrochlorothiazide (Hydrochlorothiazide)  25 mg PO DAILY [facility]


Acetaminophen 1,000 mg/ Premix  100 mls @ 400 mls/hr IV ONARRIVE [facility]


   Last Admin: 09/25/18 00:15 Dose:  400 mls/hr


Cefazolin Sodium/Dextrose 2 gm (/ Premix)  50 mls @ 100 mls/hr IV ONCALL [facility]


Ropivacaine 49.25 ml/Ketorolac Tromethamine 30 mg/Epinephrine HCl 0.5 mg/

Clonidine HCl 80 mcg/ Sodium Chloride  70 mls @ 50 mls/sec INJECT ASDIRECTED [facility]


Tranexamic Acid 4,000 mg/ (Sodium Chloride)  140 mls @ 600 mls/hr IV ASDIRECTED 

[facility]


Insulin Aspart (Novolog)  8 unit SUBCUT TIDMEALS [facility]


   Last Admin: 09/24/18 16:43 Dose:  8 units


Insulin Glargine (Lantus Solostar)  20 units SUBCUT QPM [facility]


   Last Admin: 09/24/18 18:18 Dose:  20 units


Ketorolac Tromethamine (Toradol)  15 mg IVPUSH ONARRIVE [facility]


   Last Admin: 09/24/18 07:15 Dose:  15 mg


Morphine Sulfate (Morphine Pca 30 Mg In 30 Ml)  30 mg IV ASDIRECTED [facility]; 

Protocol


   Stop: 09/25/18 08:00


   Last Admin: 09/24/18 10:14 Dose:  30 mg


Morphine Sulfate (Morphine Sulfate)  1 - 3 mg IV Q3H PRN


   PRN Reason: Pain


Omeprazole (Omeprazole)  20 mg PO DAILY [facility]


Ondansetron HCl (Zofran)  4 mg IV Q6HR PRN


   PRN Reason: NAUSEA/VOMITING


Ondansetron HCl (Zofran Odt)  4 mg PO Q8H PRN


   PRN Reason: Nausea/Vomiting


Oxycodone HCl (Oxycodone)  5 - 10 mg PO Q4H PRN


   PRN Reason: Pain


   Stop: 09/25/18 08:00


   Last Admin: 09/25/18 06:25 Dose:  10 mg


Oxycodone/Acetaminophen (Percocet 325-5 Mg)  1 - 2 tab PO Q4H PRN


   PRN Reason: Pain


Bisoprolol 5 Mg  1 each PO DAILY [facility]


Pioglitazone HCl (Actos)  30 mg PO DAILY [facility]


Polyethylene Glycol (Miralax)  17 gm PO BID [facility]


   Last Admin: 09/24/18 20:18 Dose:  Not Given


Rosuvastatin Calcium (Crestor)  10 mg PO DAILY [facility]


Scopolamine (Transderm-Scop)  1.5 mg TRDERM ONARRIVE [facility]


   Last Admin: 09/24/18 07:14 Dose:  1.5 mg


Sodium Chloride (Saline Flush)  10 ml FLUSH ASDIRECTED PRN


   PRN Reason: Keep Vein Open


Sodium Chloride (Saline Flush)  2.5 ml FLUSH ASDIRECTED PRN


   PRN Reason: Keep Vein Open





Discontinued Medications





Bupivacaine Liposome (Exparel)  20 ml INFILT .STK-MED ONE


   Stop: 09/24/18 13:35


Cefazolin Sodium/Dextrose (Ancef) Confirm Administered Dose 2 gm IV .STK-MED ONE


   Stop: 09/24/18 07:26


Ephedrine Sulfate (Ephedrine Sulfate) Confirm Administered Dose 50 mg .ROUTE 

.STK-MED ONE


   Stop: 09/24/18 08:23


Fentanyl (Sublimaze) Confirm Administered Dose 100 mcg .ROUTE .STK-MED ONE


   Stop: 09/24/18 07:27


Lactated Ringer's (Ringers, Lactated)  1,000 mls @ 100 mls/hr IV ASDIRECTED [facility]


   Last Admin: 09/25/18 03:22 Dose:  100 mls/hr


Acetaminophen 1,000 mg/ Premix  100 mls @ 400 mls/hr IV Q6H [facility]


   Stop: 09/25/18 01:14


   Last Admin: 09/25/18 00:21 Dose:  400 mls/hr


Cefazolin Sodium/Dextrose 2 gm (/ Premix)  50 mls @ 100 mls/hr IV Q8H [facility]


   Stop: 09/25/18 00:29


   Last Admin: 09/24/18 23:04 Dose:  100 mls/hr


Ketorolac Tromethamine (Toradol)  15 mg IVPUSH Q6H [facility]


   Stop: 09/25/18 05:00


   Last Admin: 09/25/18 03:22 Dose:  15 mg


Midazolam HCl (Versed 1 Mg/Ml) Confirm Administered Dose 2 mg .ROUTE .STK-MED 

ONE


   Stop: 09/24/18 07:27


Phenylephrine HCl (Phenylephrine In Ns 100 Mcg/Ml) Confirm Administered Dose 1 

mg .ROUTE .STK-MED ONE


   Stop: 09/24/18 08:30


Propofol (Diprivan  20 Ml) Confirm Administered Dose 600 mg .ROUTE .STK-MED ONE


   Stop: 09/24/18 07:27


Tranexamic Acid (Cyklokapron) Confirm Administered Dose 4,000 mg .ROUTE .STK-

MED ONE


   Stop: 09/24/18 07:38


Tranexamic Acid (Cyklokapron) Confirm Administered Dose 1,000 mg .ROUTE .STK-

MED ONE


   Stop: 09/24/18 14:11











- Exam


Wound/Incisions: Dressing Dry and Intact


General: Alert, Oriented


Cardiovascular: Regular Rate, Regular Rhythm


Extremities: Other (exam LLE - at/ehl/gastroc 5/5, dp 2+, sensation intact 

distally)


Physical Findings Comment:: 


vss, afeb


hgb 14.1


uo 1500mL








- Problem List Review


Problem List Initiated/Reviewed/Updated: Yes





- My Orders


Last 24 Hours: 


 Active Orders 24 hr











 Category Date Time Status


 


 Patient Status [ADT] Routine ADT  09/24/18 06:00 Active


 


 Activity as Tolerated [RC] .Routine Care  09/24/18 09:48 Active


 


 Blood Glucose Check, Bedside [RC] ACBED Care  09/24/18 06:00 Active


 


 Communication Order [RC] DAILY Care  09/24/18 09:48 Active


 


 Dressing Change [Wound Care] [RC] ASDIRECTED Care  09/24/18 09:48 Active


 


 Insert Urinary Catheter [OM.PC] Q24H Care  09/24/18 08:00 Ordered


 


 Neurovascular Check [RC] Q2HR Care  09/24/18 09:47 Active


 


 Notify Provider Consults [RC] ASDIRECTED Care  09/24/18 09:51 Active


 


 Notify Provider Vital Signs [RC] ASDIRECTED Care  09/24/18 09:48 Active


 


 RT Incentive Spirometry [RC] ASDIRECTED Care  09/24/18 09:47 Active


 


 Urinary Catheter Removal [RC] Per Unit Routine Care  09/25/18 06:44 Ordered


 


 Vital Signs [RC] Q4H Care  09/24/18 09:47 Active


 


 Consult to Physician [CONS] Routine Cons  09/24/18 09:46 Active


 


 PT Evaluation and Treatment [CONS] Routine Cons  09/24/18 09:47 Active


 


 [employer] Diet [DIET] Diet  09/24/18 Lunch Active


 


 HEMOGLOBIN/HEMATOCRIT,HH [HEME] DAILY Lab  09/26/18 06:00 Ordered


 


 HEMOGLOBIN/HEMATOCRIT,HH [HEME] DAILY Lab  09/27/18 06:00 Ordered


 


 Acetaminophen [Ofirmev] 1,000 mg Med  09/24/18 06:00 Active





 Premix Bag 1 bag   





 IV ONARRIVE   


 


 Acetaminophen/oxyCODONE [Percocet 325-5 MG] Med  09/25/18 08:00 Active





 1 - 2 tab PO Q4H PRN   


 


 Allopurinol [Zyloprim] Med  09/25/18 09:00 Active





 300 mg PO DAILY   


 


 Alum Hydrox/Mag Hydrox/Simeth [Mag-Al Plus] Med  09/24/18 09:49 Active





 30 ml PO Q4H PRN   


 


 Aspirin Med  09/25/18 09:00 Active





 325 mg PO BID   


 


 Bisacodyl [Dulcolax] Med  09/24/18 09:49 Active





 10 mg RECTAL DAILY PRN   


 


 Bupivacaine Liposome/PF [Exparel] Med  09/24/18 08:00 Active





 20 ml INFILT ONETIME   


 


 Celecoxib [CeleBREX] Med  09/25/18 09:00 Active





 200 mg PO DAILY   


 


 Docusate Sodium [Colace] Med  09/24/18 21:00 Active





 100 mg PO BID   


 


 Famotidine [Pepcid] Med  09/24/18 06:00 Active





 40 mg IVPUSH ONARRIVE   


 


 Famotidine [Pepcid] Med  09/25/18 09:00 Active





 40 mg PO DAILY   


 


 Insulin Aspart [NovoLOG] Med  09/24/18 12:00 Active





 8 unit SUBCUT TIDMEALS   


 


 Insulin Glarg,Human.Rec.Analog [LantUS Solostar] Med  09/24/18 18:00 Active





 20 units SUBCUT QPM   


 


 Ketorolac [Toradol] Med  09/24/18 06:00 Active





 15 mg IVPUSH ONARRIVE   


 


 Morphine PF [Morphine PCA 30 MG in 30 ML] Med  09/24/18 10:00 Active





 30 mg IV ASDIRECTED   


 


 Morphine Sulfate Med  09/25/18 08:00 Active





 1 - 3 mg IV Q3H PRN   


 


 Omeprazole Med  09/25/18 09:00 Active





 20 mg PO DAILY   


 


 Ondansetron [Zofran ODT] Med  09/25/18 06:44 Ordered





 4 mg PO Q8H PRN   


 


 Ondansetron [Zofran] Med  09/24/18 09:48 Active





 4 mg IV Q6HR PRN   


 


 Patient's Own Medication [Ptom] Med  09/25/18 09:00 Active





 1 each PO DAILY   


 


 Pioglitazone [Actos] Med  09/25/18 09:00 Active





 30 mg PO DAILY   


 


 Polyethylene Glycol 3350 [MiraLAX] Med  09/24/18 21:00 Active





 17 gm PO BID   


 


 Ropivacaine [Naropin 0.2%] 49.25 ml Med  09/24/18 08:00 Active





 Ketorolac [Toradol] 30 mg   





 EPINEPHrine [Adrenalin] 0.5 mg   





 cloNIDine [Duraclon] 80 mcg   





 Sodium Chloride 0.9% [Normal Saline] 18.45 ml   





 INJECT ASDIRECTED   


 


 Rosuvastatin [Crestor] Med  09/25/18 09:00 Active





 10 mg PO DAILY   


 


 Scopolamine [Transderm-Scop] Med  09/24/18 06:00 Active





 1.5 mg TRDERM ONARRIVE   


 


 Sodium Chloride 0.9% [Saline Flush] Med  09/25/18 06:44 Ordered





 10 ml FLUSH ASDIRECTED PRN   


 


 Sodium Chloride 0.9% [Saline Flush] Med  09/25/18 06:44 Ordered





 2.5 ml FLUSH ASDIRECTED PRN   


 


 Tranexamic Acid [Cyklokapron] 4,000 mg Med  09/24/18 08:00 Active





 Sodium Chloride 0.9% [Normal Saline] 100 ml   





 IV ASDIRECTED   


 


 amLODIPine [Norvasc] Med  09/25/18 09:00 Active





 10 mg PO DAILY   


 


 ceFAZolin [Ancef] 2 gm Med  09/24/18 08:00 Active





 Premix Bag 1 bag   





 IV ONCALL   


 


 diphenhydrAMINE [Benadryl] Med  09/24/18 09:49 Active





 25 - 50 mg PO Q6H PRN   


 


 hydroCHLOROthiazide Med  09/25/18 09:00 Active





 25 mg PO DAILY   


 


 oxyCODONE Med  09/24/18 09:49 Active





 5 - 10 mg PO Q4H PRN   


 


 Antiembolic Hose [OM.PC] Routine Oth  09/24/18 06:00 Ordered


 


 Convert IV to Saline Lock [OM.PC] Routine Oth  09/25/18 06:44 Ordered


 


 Ice Therapy [OM.PC] Routine Oth  09/24/18 09:47 Ordered


 


 Obtain Home Medication List [OM.PC] Routine Oth  09/24/18 06:00 Ordered


 


 Sequential Compression Device [OM.PC] Routine Oth  09/24/18 06:00 Ordered








 Medication Orders





Al Hydroxide/Mg Hydroxide (Mag-Al Plus)  30 ml PO Q4H PRN


   PRN Reason: indigestion


Allopurinol (Zyloprim)  300 mg PO DAILY [facility]


Amlodipine Besylate (Norvasc)  10 mg PO DAILY [facility]


Aspirin (Aspirin)  325 mg PO BID [facility]


Bisacodyl (Dulcolax)  10 mg RECTAL DAILY PRN


   PRN Reason: Constipation


Bupivacaine Liposome (Exparel)  20 ml INFILT ONETIME [facility]


Celecoxib (Celebrex)  200 mg PO DAILY [facility]


Diphenhydramine HCl (Benadryl)  25 - 50 mg PO Q6H PRN


   PRN Reason: Itching


Docusate Sodium (Colace)  100 mg PO BID [facility]


   Last Admin: 09/24/18 20:17  Dose: 100 mg


Famotidine (Pepcid)  40 mg IVPUSH ONARRIVE [facility]


   Last Admin: 09/24/18 07:14  Dose: 40 mg


Famotidine (Pepcid)  40 mg PO DAILY [facility]


Hydrochlorothiazide (Hydrochlorothiazide)  25 mg PO DAILY [facility]


Acetaminophen 1,000 mg/ Premix  100 mls @ 400 mls/hr IV ONARRIVE [facility]


   Last Admin: 09/25/18 00:15  Dose: 400 mls/hr


   Infusion: 09/24/18 07:28  Dose: 400 mls/hr


   Admin: 09/24/18 07:13  Dose: 400 mls/hr


Cefazolin Sodium/Dextrose 2 gm (/ Premix)  50 mls @ 100 mls/hr IV ONCALL [facility]


Ropivacaine 49.25 ml/Ketorolac Tromethamine 30 mg/Epinephrine HCl 0.5 mg/

Clonidine HCl 80 mcg/ Sodium Chloride  70 mls @ 50 mls/sec INJECT ASDIRECTED [facility]


Tranexamic Acid 4,000 mg/ (Sodium Chloride)  140 mls @ 600 mls/hr IV ASDIRECTED 

[facility]


Insulin Aspart (Novolog)  8 unit SUBCUT TIDMEALS [facility]


   Last Admin: 09/24/18 16:43  Dose: 8 units


   Admin: 09/24/18 13:50  Dose: 8 units


Insulin Glargine (Lantus Solostar)  20 units SUBCUT QPM [facility]


   Last Admin: 09/24/18 18:18  Dose: 20 units


Ketorolac Tromethamine (Toradol)  15 mg IVPUSH ONARRIVE [facility]


   Last Admin: 09/24/18 07:15  Dose: 15 mg


Morphine Sulfate (Morphine Pca 30 Mg In 30 Ml)  30 mg IV ASDIRECTED [facility]; 

Protocol


   Stop: 09/25/18 08:00


   Last Admin: 09/24/18 10:14  Dose: 30 mg


Morphine Sulfate (Morphine Sulfate)  1 - 3 mg IV Q3H PRN


   PRN Reason: Pain


Omeprazole (Omeprazole)  20 mg PO DAILY [facility]


Ondansetron HCl (Zofran)  4 mg IV Q6HR PRN


   PRN Reason: NAUSEA/VOMITING


Ondansetron HCl (Zofran Odt)  4 mg PO Q8H PRN


   PRN Reason: Nausea/Vomiting


Oxycodone HCl (Oxycodone)  5 - 10 mg PO Q4H PRN


   PRN Reason: Pain


   Stop: 09/25/18 08:00


   Last Admin: 09/25/18 06:25  Dose: 10 mg


   Admin: 09/25/18 00:23  Dose: 10 mg


   Admin: 09/24/18 20:24  Dose: 10 mg


Oxycodone/Acetaminophen (Percocet 325-5 Mg)  1 - 2 tab PO Q4H PRN


   PRN Reason: Pain


Bisoprolol 5 Mg  1 each PO DAILY [facility]


Pioglitazone HCl (Actos)  30 mg PO DAILY [facility]


Polyethylene Glycol (Miralax)  17 gm PO BID [facility]


   Last Admin: 09/24/18 20:18 Dose:  Not Given


Rosuvastatin Calcium (Crestor)  10 mg PO DAILY [facility]


Scopolamine (Transderm-Scop)  1.5 mg TRDERM ONARRIVE [facility]


   Last Admin: 09/24/18 07:14  Dose: 1.5 mg


Sodium Chloride (Saline Flush)  10 ml FLUSH ASDIRECTED PRN


   PRN Reason: Keep Vein Open


Sodium Chloride (Saline Flush)  2.5 ml FLUSH ASDIRECTED PRN


   PRN Reason: Keep Vein Open











- Assessment


Assessment           (Free Text/Narrative):: 


POD#1 L TKA








- Plan


Plan                        (Free Text/Narrative):: 


DC IV access


DC IV medications


DC nicolas


continue pain management


PT today


325mg ASA PO BID as DVT prophylaxis + SCD boots, ambulation


change dressing to long aquacel dressing today


may d/ch to home this afternoon if pain controlled/ambulating well


dc meds/walker written








<Shefali Bravo - Last Filed: 09/25/18 17:03>





- Patient Data


Vitals - Most Recent: 


 Last Vital Signs











Temp  97.7 F   09/25/18 11:36


 


Pulse  79   09/25/18 11:36


 


Resp  16   09/25/18 11:36


 


BP  125/61   09/25/18 11:36


 


Pulse Ox  94 L  09/25/18 11:36











I&O - Last 24 Hours: 


 Intake & Output











 09/25/18 09/25/18 09/25/18





 06:59 14:59 22:59


 


Intake Total 900 800 


 


Output Total 950 200 


 


Balance -50 600 











Lab Results Last 24 Hrs: 


 Laboratory Results - last 24 hr











  09/24/18 09/25/18 09/25/18 Range/Units





  16:42 05:20 05:20 


 


Hgb   14.5   (13.0-17.0)  g/dL


 


Hct   42.4   (38.0-50.0)  %


 


Sodium    135 L  (136-148)  mmol/L


 


Potassium    4.5  (3.5-5.1)  mmol/L


 


Chloride    101  ()  mmol/L


 


Carbon Dioxide    28.4  (21.0-32.0)  mmol/L


 


BUN    28 H  (7.0-18.0)  mg/dL


 


Creatinine    1.8 H  (0.8-1.3)  mg/dL


 


Est Cr Clr Drug Dosing    41.91  mL/min


 


Estimated GFR (MDRD)    37.5  ml/min


 


Glucose    113 H  ()  mg/dL


 


POC Glucose  113 H    ()  mg/dL


 


Calcium    8.4 L  (8.5-10.1)  mg/dL


 


Total Bilirubin    0.5  (0.2-1.0)  mg/dL


 


AST    19  (15-37)  IU/L


 


ALT    21  (14-63)  IU/L


 


Alkaline Phosphatase    103  ()  U/L


 


Total Protein    6.1 L  (6.4-8.2)  g/dL


 


Albumin    2.8 L  (3.4-5.0)  g/dL


 


Globulin    3.3  (2.0-3.5)  g/dL


 


Albumin/Globulin Ratio    0.9 L  (1.3-2.8)  














  09/25/18 Range/Units





  07:26 


 


Hgb   (13.0-17.0)  g/dL


 


Hct   (38.0-50.0)  %


 


Sodium   (136-148)  mmol/L


 


Potassium   (3.5-5.1)  mmol/L


 


Chloride   ()  mmol/L


 


Carbon Dioxide   (21.0-32.0)  mmol/L


 


BUN   (7.0-18.0)  mg/dL


 


Creatinine   (0.8-1.3)  mg/dL


 


Est Cr Clr Drug Dosing   mL/min


 


Estimated GFR (MDRD)   ml/min


 


Glucose   ()  mg/dL


 


POC Glucose  136 H  ()  mg/dL


 


Calcium   (8.5-10.1)  mg/dL


 


Total Bilirubin   (0.2-1.0)  mg/dL


 


AST   (15-37)  IU/L


 


ALT   (14-63)  IU/L


 


Alkaline Phosphatase   ()  U/L


 


Total Protein   (6.4-8.2)  g/dL


 


Albumin   (3.4-5.0)  g/dL


 


Globulin   (2.0-3.5)  g/dL


 


Albumin/Globulin Ratio   (1.3-2.8)  











Med Orders - Current: 


 Current Medications








Discontinued Medications





Al Hydroxide/Mg Hydroxide (Mag-Al Plus)  30 ml PO Q4H PRN


   PRN Reason: indigestion


Allopurinol (Zyloprim)  300 mg PO DAILY [facility]


   Last Admin: 09/25/18 09:00 Dose:  300 mg


Amlodipine Besylate (Norvasc)  10 mg PO DAILY [facility]


   Last Admin: 09/25/18 08:58 Dose:  10 mg


Aspirin (Aspirin)  325 mg PO BID [facility]


   Last Admin: 09/25/18 08:59 Dose:  325 mg


Bisacodyl (Dulcolax)  10 mg RECTAL DAILY PRN


   PRN Reason: Constipation


Bupivacaine Liposome (Exparel)  20 ml INFILT ONETIME [facility]


Bupivacaine Liposome (Exparel)  20 ml INFILT .STK-MED ONE


   Stop: 09/24/18 13:35


Cefazolin Sodium/Dextrose (Ancef) Confirm Administered Dose 2 gm IV .STK-MED ONE


   Stop: 09/24/18 07:26


Celecoxib (Celebrex)  200 mg PO DAILY [facility]


   Last Admin: 09/25/18 09:00 Dose:  200 mg


Diphenhydramine HCl (Benadryl)  25 - 50 mg PO Q6H PRN


   PRN Reason: Itching


Docusate Sodium (Colace)  100 mg PO BID [facility]


   Last Admin: 09/25/18 08:59 Dose:  100 mg


Ephedrine Sulfate (Ephedrine Sulfate) Confirm Administered Dose 50 mg .ROUTE 

.STK-MED ONE


   Stop: 09/24/18 08:23


Famotidine (Pepcid)  40 mg IVPUSH ONARRIVE [facility]


   Last Admin: 09/24/18 07:14 Dose:  40 mg


Famotidine (Pepcid)  40 mg PO DAILY [facility]


   Last Admin: 09/25/18 09:01 Dose:  40 mg


Fentanyl (Sublimaze) Confirm Administered Dose 100 mcg .ROUTE .STK-MED ONE


   Stop: 09/24/18 07:27


Hydrochlorothiazide (Hydrochlorothiazide)  25 mg PO DAILY [facility]


   Last Admin: 09/25/18 08:59 Dose:  25 mg


Acetaminophen 1,000 mg/ Premix  100 mls @ 400 mls/hr IV ONARRIVE [facility]


   Last Admin: 09/25/18 00:15 Dose:  400 mls/hr


Cefazolin Sodium/Dextrose 2 gm (/ Premix)  50 mls @ 100 mls/hr IV ONCALL [facility]


Ropivacaine 49.25 ml/Ketorolac Tromethamine 30 mg/Epinephrine HCl 0.5 mg/

Clonidine HCl 80 mcg/ Sodium Chloride  70 mls @ 50 mls/sec INJECT ASDIRECTED [facility]


Lactated Ringer's (Ringers, Lactated)  1,000 mls @ 100 mls/hr IV ASDIRECTED [facility]


   Last Admin: 09/25/18 03:22 Dose:  100 mls/hr


Tranexamic Acid 4,000 mg/ (Sodium Chloride)  140 mls @ 600 mls/hr IV ASDIRECTED 

[facility]


Acetaminophen 1,000 mg/ Premix  100 mls @ 400 mls/hr IV Q6H [facility]


   Stop: 09/25/18 01:14


   Last Admin: 09/25/18 00:21 Dose:  400 mls/hr


Cefazolin Sodium/Dextrose 2 gm (/ Premix)  50 mls @ 100 mls/hr IV Q8H [facility]


   Stop: 09/25/18 00:29


   Last Admin: 09/24/18 23:04 Dose:  100 mls/hr


Insulin Aspart (Novolog)  8 unit SUBCUT TIDMEALS [facility]


   Last Admin: 09/25/18 12:29 Dose:  8 units


Insulin Glargine (Lantus Solostar)  20 units SUBCUT QPM [facility]


   Last Admin: 09/24/18 18:18 Dose:  20 units


Ketorolac Tromethamine (Toradol)  15 mg IVPUSH ONARRIVE [facility]


   Last Admin: 09/24/18 07:15 Dose:  15 mg


Ketorolac Tromethamine (Toradol)  15 mg IVPUSH Q6H [facility]


   Stop: 09/25/18 05:00


   Last Admin: 09/25/18 03:22 Dose:  15 mg


Midazolam HCl (Versed 1 Mg/Ml) Confirm Administered Dose 2 mg .ROUTE .STK-MED 

ONE


   Stop: 09/24/18 07:27


Morphine Sulfate (Morphine Pca 30 Mg In 30 Ml)  30 mg IV ASDIRECTED [facility]; 

Protocol


   Stop: 09/25/18 08:00


   Last Admin: 09/24/18 10:14 Dose:  30 mg


Morphine Sulfate (Morphine Sulfate)  1 - 3 mg IV Q3H PRN


   PRN Reason: Pain


Omeprazole (Omeprazole)  20 mg PO DAILY [facility]


   Last Admin: 09/25/18 09:00 Dose:  20 mg


Ondansetron HCl (Zofran)  4 mg IV Q6HR PRN


   PRN Reason: NAUSEA/VOMITING


Ondansetron HCl (Zofran Odt)  4 mg PO Q8H PRN


   PRN Reason: Nausea/Vomiting


   Last Admin: 09/25/18 06:59 Dose:  4 mg


Oxycodone HCl (Oxycodone)  5 - 10 mg PO Q4H PRN


   PRN Reason: Pain


   Stop: 09/25/18 08:00


   Last Admin: 09/25/18 06:25 Dose:  10 mg


Oxycodone/Acetaminophen (Percocet 325-5 Mg)  1 - 2 tab PO Q4H PRN


   PRN Reason: Pain


   Last Admin: 09/25/18 10:11 Dose:  2 tab


Bisoprolol 5 Mg  1 each PO DAILY [facility]


   Last Admin: 09/25/18 09:02 Dose:  Not Given


Phenylephrine HCl (Phenylephrine In Ns 100 Mcg/Ml) Confirm Administered Dose 1 

mg .ROUTE .STK-MED ONE


   Stop: 09/24/18 08:30


Pioglitazone HCl (Actos)  30 mg PO DAILY [facility]


   Last Admin: 09/25/18 09:00 Dose:  30 mg


Polyethylene Glycol (Miralax)  17 gm PO BID [facility]


   Last Admin: 09/25/18 08:58 Dose:  17 gm


Propofol (Diprivan  20 Ml) Confirm Administered Dose 600 mg .ROUTE .STK-MED ONE


   Stop: 09/24/18 07:27


Rosuvastatin Calcium (Crestor)  10 mg PO DAILY [facility]


   Last Admin: 09/25/18 09:00 Dose:  10 mg


Scopolamine (Transderm-Scop)  1.5 mg TRDERM ONARRIVE [facility]


   Last Admin: 09/24/18 07:14 Dose:  1.5 mg


Sodium Chloride (Saline Flush)  10 ml FLUSH ASDIRECTED PRN


   PRN Reason: Keep Vein Open


Sodium Chloride (Saline Flush)  2.5 ml FLUSH ASDIRECTED PRN


   PRN Reason: Keep Vein Open


Tranexamic Acid (Cyklokapron) Confirm Administered Dose 4,000 mg .ROUTE .STK-

MED ONE


   Stop: 09/24/18 07:38


Tranexamic Acid (Cyklokapron) Confirm Administered Dose 1,000 mg .ROUTE .STK-

MED ONE


   Stop: 09/24/18 14:11











- My Orders


Last 24 Hours: 


 Active Orders 24 hr











 Category Date Time Status


 


 Ready for Discharge [RC] PER UNIT ROUTINE Care  09/25/18 13:37 Active


 


 Urinary Catheter Removal [RC] Per Unit Routine Care  09/25/18 06:44 Active


 


 Convert IV to Saline Lock [OM.PC] Routine Oth  09/25/18 06:44 Ordered

## 2018-09-26 NOTE — DISCH
DATE OF DISCHARGE:

09/25/2018

 

PRIMARY CARE PHYSICIAN:

Donna Waldron NP

 

ADMITTING DIAGNOSIS:

Degenerative joint disease, left knee, tricompartmental.

 

OTHER MEDICAL DIAGNOSES:

1. Sleep apnea.

2. Insulin-dependent diabetic.

3. Peripheral vascular disease.

4. Maldonado esophagus.

5. Depression.

 

DISCHARGE DIAGNOSES:

1. Degenerative joint disease, left knee, tricompartmental.

2. Sleep apnea.

3. Insulin-dependent diabetic.

4. Peripheral vascular disease.

5. Maldonado esophagus.

6. Depression.

 

BRIEF HISTORY:

Daniel is a 68-year-old male who has had progressive complaints of left knee

pain.  He has tried and failed conservative treatment.  At that time, surgical

treatment was recommended.

 

On September 24, 2018, the patient underwent a left total knee arthroplasty

using patient-specific instrumentation done by Dr. Shefali Bravo.  This was done

under spinal anesthesia with sedation.  Estimated blood loss was 50 mL.  Please

refer to the operative record for tourniquet time.  There were no known

complications.  Upon completion of the procedure, the patient transferred to the

PACU and subsequently to [facility] for postoperative care.

 

HOSPITAL COURSE:

Postoperatively, the patient did well.  His pain is controlled with oral pain

medications.  He received 2 doses of Ancef postoperatively for a total of 24

hours of antibiotic coverage.  Physical Therapy and the Hospitalist Service

followed him through his hospital stay.  Aspirin 325 mg by mouth twice daily was

started on postoperative day #1 as DVT prophylaxis.

 

His vital signs have been stable.  He has been afebrile.  His hemoglobin on the

morning of September 25, was 14.3.  His pain is well controlled.  He is

ambulating well with a wheeled walker.  He feels comfortable with discharge to

home.

 

DISCHARGE MEDICATIONS:

1. Percocet 5/325.

2. Celebrex 200 mg.

3. Colace 100 mg.

4. MiraLAX.

5. Aspirin 325 mg.

 

DISCHARGE INSTRUCTIONS:

1. Follow up in the clinic in 10 to 14 days from the date of procedure.  This

    appointment has been made for the patient.

2. Outpatient physical therapy 2 to 3 times per week for 4 to 6 weeks.

3. Polar Care to the left knee as needed.

4. NIDHI hose, on in the morning, off in the evening.

5. No driving while taking narcotic pain medications.

 

For complete medication reconciliation and discharge instructions, please refer

back to the patient's EHR.

 

Should he have questions or concerns prior to followup, he has been advised to

contact the clinic.

 

 

JOSE DE JESUS SHAH

DD:  09/26/2018 09:16:16

DT:  09/26/2018 11:59:42

Job #:  [id number]

## 2025-01-20 NOTE — PCM.OPNOTE
- General Post-Op/Procedure Note


Date of Surgery/Procedure: 09/24/18


Operative Procedure(s): L TKA


Post-Op Diagnosis: DJD left knee


Primary Surgeon: Shefali Bravo


Assistant: Ivory White in mLs: 50


Condition: Good


Free Text/Narrative:: 


[id number] abdominal pain